# Patient Record
Sex: MALE | Race: WHITE | NOT HISPANIC OR LATINO | Employment: FULL TIME | URBAN - METROPOLITAN AREA
[De-identification: names, ages, dates, MRNs, and addresses within clinical notes are randomized per-mention and may not be internally consistent; named-entity substitution may affect disease eponyms.]

---

## 2017-01-19 ENCOUNTER — GENERIC CONVERSION - ENCOUNTER (OUTPATIENT)
Dept: OTHER | Facility: OTHER | Age: 52
End: 2017-01-19

## 2017-03-02 ENCOUNTER — ALLSCRIPTS OFFICE VISIT (OUTPATIENT)
Dept: OTHER | Facility: OTHER | Age: 52
End: 2017-03-02

## 2017-03-02 DIAGNOSIS — R94.31 ABNORMAL ELECTROCARDIOGRAM: ICD-10-CM

## 2017-03-02 DIAGNOSIS — Z00.00 ENCOUNTER FOR GENERAL ADULT MEDICAL EXAMINATION WITHOUT ABNORMAL FINDINGS: ICD-10-CM

## 2017-03-03 ENCOUNTER — GENERIC CONVERSION - ENCOUNTER (OUTPATIENT)
Dept: OTHER | Facility: OTHER | Age: 52
End: 2017-03-03

## 2017-03-06 ENCOUNTER — GENERIC CONVERSION - ENCOUNTER (OUTPATIENT)
Dept: OTHER | Facility: OTHER | Age: 52
End: 2017-03-06

## 2017-03-07 ENCOUNTER — ALLSCRIPTS OFFICE VISIT (OUTPATIENT)
Dept: OTHER | Facility: OTHER | Age: 52
End: 2017-03-07

## 2017-03-08 ENCOUNTER — GENERIC CONVERSION - ENCOUNTER (OUTPATIENT)
Dept: OTHER | Facility: OTHER | Age: 52
End: 2017-03-08

## 2017-03-08 LAB
A/G RATIO (HISTORICAL): 2.4 (ref 1.1–2.5)
ALBUMIN SERPL BCP-MCNC: 4.5 G/DL (ref 3.5–5.5)
ALP SERPL-CCNC: 52 IU/L (ref 39–117)
ALT SERPL W P-5'-P-CCNC: 13 IU/L (ref 0–44)
AST SERPL W P-5'-P-CCNC: 16 IU/L (ref 0–40)
BASOPHILS # BLD AUTO: 0 X10E3/UL (ref 0–0.2)
BASOPHILS # BLD AUTO: 1 %
BILIRUB SERPL-MCNC: 0.8 MG/DL (ref 0–1.2)
BUN SERPL-MCNC: 16 MG/DL (ref 6–24)
BUN/CREA RATIO (HISTORICAL): 14 (ref 9–20)
CALCIUM SERPL-MCNC: 9.3 MG/DL (ref 8.7–10.2)
CHLORIDE SERPL-SCNC: 100 MMOL/L (ref 96–106)
CHOLEST SERPL-MCNC: 206 MG/DL (ref 100–199)
CHOLEST/HDLC SERPL: 4 RATIO UNITS (ref 0–5)
CO2 SERPL-SCNC: 24 MMOL/L (ref 18–29)
CREAT SERPL-MCNC: 1.17 MG/DL (ref 0.76–1.27)
DEPRECATED RDW RBC AUTO: 13.6 % (ref 12.3–15.4)
EGFR AFRICAN AMERICAN (HISTORICAL): 83 ML/MIN/1.73
EGFR-AMERICAN CALC (HISTORICAL): 72 ML/MIN/1.73
EOSINOPHIL # BLD AUTO: 0.1 X10E3/UL (ref 0–0.4)
EOSINOPHIL # BLD AUTO: 1 %
GLUCOSE SERPL-MCNC: 92 MG/DL (ref 65–99)
HCT VFR BLD AUTO: 47.2 % (ref 37.5–51)
HDLC SERPL-MCNC: 51 MG/DL
HGB BLD-MCNC: 15.9 G/DL (ref 12.6–17.7)
IMM.GRANULOCYTES (CD4/8) (HISTORICAL): 0 %
IMM.GRANULOCYTES (CD4/8) (HISTORICAL): 0 X10E3/UL (ref 0–0.1)
LDLC SERPL CALC-MCNC: 132 MG/DL (ref 0–99)
LYMPHOCYTES # BLD AUTO: 1 X10E3/UL (ref 0.7–3.1)
LYMPHOCYTES # BLD AUTO: 26 %
MCH RBC QN AUTO: 30.9 PG (ref 26.6–33)
MCHC RBC AUTO-ENTMCNC: 33.7 G/DL (ref 31.5–35.7)
MCV RBC AUTO: 92 FL (ref 79–97)
MONOCYTES # BLD AUTO: 0.5 X10E3/UL (ref 0.1–0.9)
MONOCYTES (HISTORICAL): 13 %
NEUTROPHILS # BLD AUTO: 2.3 X10E3/UL (ref 1.4–7)
NEUTROPHILS # BLD AUTO: 59 %
PLATELET # BLD AUTO: 157 X10E3/UL (ref 150–379)
POTASSIUM SERPL-SCNC: 4.3 MMOL/L (ref 3.5–5.2)
RBC (HISTORICAL): 5.14 X10E6/UL (ref 4.14–5.8)
SODIUM SERPL-SCNC: 141 MMOL/L (ref 134–144)
TOT. GLOBULIN, SERUM (HISTORICAL): 1.9 G/DL (ref 1.5–4.5)
TOTAL PROTEIN (HISTORICAL): 6.4 G/DL (ref 6–8.5)
TRIGL SERPL-MCNC: 115 MG/DL (ref 0–149)
TSH SERPL DL<=0.05 MIU/L-ACNC: 2.36 UIU/ML (ref 0.45–4.5)
VLDLC SERPL CALC-MCNC: 23 MG/DL (ref 5–40)
WBC # BLD AUTO: 3.9 X10E3/UL (ref 3.4–10.8)

## 2017-03-09 LAB
BACTERIA UR QL AUTO: ABNORMAL
BILIRUB UR QL STRIP: NEGATIVE
COLOR UR: YELLOW
COMMENT (HISTORICAL): CLEAR
FECAL OCCULT BLOOD DIAGNOSTIC (HISTORICAL): NEGATIVE
GLUCOSE (HISTORICAL): NEGATIVE
KETONES UR STRIP-MCNC: NEGATIVE MG/DL
LEUKOCYTE ESTERASE UR QL STRIP: NEGATIVE
MICROSCOPIC EXAMINATION (HISTORICAL): ABNORMAL
MICROSCOPIC EXAMINATION (HISTORICAL): ABNORMAL
MUCUS THREADS (HISTORICAL): PRESENT
NITRITE UR QL STRIP: NEGATIVE
NON-SQ EPI CELLS URNS QL MICRO: ABNORMAL /HPF
PH UR STRIP.AUTO: 6.5 [PH] (ref 5–7.5)
PROT UR STRIP-MCNC: NEGATIVE MG/DL
RBC (HISTORICAL): ABNORMAL /HPF
SP GR UR STRIP.AUTO: <=1.005 (ref 1–1.03)
URINALYSIS (UA) (HISTORICAL): ABNORMAL
UROBILINOGEN UR QL STRIP.AUTO: 0.2 EU/DL (ref 0.2–1)
WBC # BLD AUTO: ABNORMAL /HPF

## 2017-03-15 ENCOUNTER — ALLSCRIPTS OFFICE VISIT (OUTPATIENT)
Dept: OTHER | Facility: OTHER | Age: 52
End: 2017-03-15

## 2017-03-16 ENCOUNTER — GENERIC CONVERSION - ENCOUNTER (OUTPATIENT)
Dept: OTHER | Facility: OTHER | Age: 52
End: 2017-03-16

## 2017-03-31 ENCOUNTER — TRANSCRIBE ORDERS (OUTPATIENT)
Dept: ADMINISTRATIVE | Facility: HOSPITAL | Age: 52
End: 2017-03-31

## 2017-03-31 DIAGNOSIS — R94.31 ABNORMAL EKG: Primary | ICD-10-CM

## 2017-04-13 ENCOUNTER — GENERIC CONVERSION - ENCOUNTER (OUTPATIENT)
Dept: OTHER | Facility: OTHER | Age: 52
End: 2017-04-13

## 2017-04-14 ENCOUNTER — HOSPITAL ENCOUNTER (OUTPATIENT)
Dept: NON INVASIVE DIAGNOSTICS | Facility: CLINIC | Age: 52
Discharge: HOME | End: 2017-04-14
Payer: COMMERCIAL

## 2017-04-14 DIAGNOSIS — R94.31 ABNORMAL EKG: ICD-10-CM

## 2017-04-14 DIAGNOSIS — R94.31 ABNORMAL ELECTROCARDIOGRAM: ICD-10-CM

## 2017-04-14 PROCEDURE — 93017 CV STRESS TEST TRACING ONLY: CPT

## 2017-04-14 PROCEDURE — 93306 TTE W/DOPPLER COMPLETE: CPT

## 2017-04-17 ENCOUNTER — GENERIC CONVERSION - ENCOUNTER (OUTPATIENT)
Dept: OTHER | Facility: OTHER | Age: 52
End: 2017-04-17

## 2017-04-25 LAB
CHEST PAIN STATEMENT: NORMAL
MAX DIASTOLIC BP: 78 MMHG
MAX HEART RATE: 181 BPM
MAX PREDICTED HEART RATE: 169 BPM
MAX. SYSTOLIC BP: 173 MMHG
PROTOCOL NAME: NORMAL
REASON FOR TERMINATION: NORMAL
TARGET HR FORMULA: NORMAL
TEST INDICATION: NORMAL
TIME IN EXERCISE PHASE: 695 S

## 2017-07-14 ENCOUNTER — GENERIC CONVERSION - ENCOUNTER (OUTPATIENT)
Dept: OTHER | Facility: OTHER | Age: 52
End: 2017-07-14

## 2017-07-14 DIAGNOSIS — M54.50 LOW BACK PAIN: ICD-10-CM

## 2017-07-25 ENCOUNTER — OFFICE VISIT (OUTPATIENT)
Dept: URGENT CARE | Facility: CLINIC | Age: 52
End: 2017-07-25
Payer: COMMERCIAL

## 2017-07-25 PROCEDURE — 90715 TDAP VACCINE 7 YRS/> IM: CPT

## 2017-07-25 PROCEDURE — 99213 OFFICE O/P EST LOW 20 MIN: CPT

## 2017-08-04 ENCOUNTER — ALLSCRIPTS OFFICE VISIT (OUTPATIENT)
Dept: OTHER | Facility: OTHER | Age: 52
End: 2017-08-04

## 2017-08-11 ENCOUNTER — ALLSCRIPTS OFFICE VISIT (OUTPATIENT)
Dept: OTHER | Facility: OTHER | Age: 52
End: 2017-08-11

## 2018-01-09 ENCOUNTER — ALLSCRIPTS OFFICE VISIT (OUTPATIENT)
Dept: OTHER | Facility: OTHER | Age: 53
End: 2018-01-09

## 2018-01-09 ENCOUNTER — APPOINTMENT (OUTPATIENT)
Dept: RADIOLOGY | Facility: CLINIC | Age: 53
End: 2018-01-09
Payer: COMMERCIAL

## 2018-01-09 ENCOUNTER — GENERIC CONVERSION - ENCOUNTER (OUTPATIENT)
Dept: OTHER | Facility: OTHER | Age: 53
End: 2018-01-09

## 2018-01-09 DIAGNOSIS — S42.009A CLOSED FRACTURE OF CLAVICLE: ICD-10-CM

## 2018-01-09 PROCEDURE — 73000 X-RAY EXAM OF COLLAR BONE: CPT

## 2018-01-09 RX ORDER — ACETAMINOPHEN 325 MG/1
650 TABLET ORAL EVERY 6 HOURS PRN
COMMUNITY

## 2018-01-09 RX ORDER — SODIUM CHLORIDE, SODIUM LACTATE, POTASSIUM CHLORIDE, CALCIUM CHLORIDE 600; 310; 30; 20 MG/100ML; MG/100ML; MG/100ML; MG/100ML
125 INJECTION, SOLUTION INTRAVENOUS CONTINUOUS
Status: CANCELLED | OUTPATIENT
Start: 2018-01-09

## 2018-01-09 NOTE — PRE-PROCEDURE INSTRUCTIONS
Pre-Surgery Instructions:   Medication Instructions    acetaminophen (TYLENOL) 325 mg tablet Instructed patient per Anesthesia Guidelines   Multiple Vitamin (MULTI VITAMIN DAILY PO) Instructed patient per Anesthesia Guidelines

## 2018-01-10 ENCOUNTER — APPOINTMENT (OUTPATIENT)
Dept: RADIOLOGY | Facility: HOSPITAL | Age: 53
End: 2018-01-10
Payer: COMMERCIAL

## 2018-01-10 ENCOUNTER — ANESTHESIA EVENT (OUTPATIENT)
Dept: PERIOP | Facility: HOSPITAL | Age: 53
End: 2018-01-10
Payer: COMMERCIAL

## 2018-01-10 ENCOUNTER — ANESTHESIA (OUTPATIENT)
Dept: PERIOP | Facility: HOSPITAL | Age: 53
End: 2018-01-10
Payer: COMMERCIAL

## 2018-01-10 ENCOUNTER — HOSPITAL ENCOUNTER (OUTPATIENT)
Facility: HOSPITAL | Age: 53
Setting detail: OUTPATIENT SURGERY
Discharge: HOME | End: 2018-01-10
Attending: ORTHOPAEDIC SURGERY | Admitting: ORTHOPAEDIC SURGERY
Payer: COMMERCIAL

## 2018-01-10 VITALS
OXYGEN SATURATION: 99 % | TEMPERATURE: 97.3 F | DIASTOLIC BLOOD PRESSURE: 77 MMHG | HEIGHT: 74 IN | BODY MASS INDEX: 22.46 KG/M2 | RESPIRATION RATE: 19 BRPM | WEIGHT: 175 LBS | SYSTOLIC BLOOD PRESSURE: 117 MMHG | HEART RATE: 88 BPM

## 2018-01-10 DIAGNOSIS — S42.022D CLOSED DISPLACED FRACTURE OF SHAFT OF LEFT CLAVICLE WITH ROUTINE HEALING, SUBSEQUENT ENCOUNTER: Primary | ICD-10-CM

## 2018-01-10 PROBLEM — S42.022A CLOSED FRACTURE OF SHAFT OF LEFT CLAVICLE: Status: ACTIVE | Noted: 2018-01-10

## 2018-01-10 PROCEDURE — C1713 ANCHOR/SCREW BN/BN,TIS/BN: HCPCS | Performed by: ORTHOPAEDIC SURGERY

## 2018-01-10 PROCEDURE — 73000 X-RAY EXAM OF COLLAR BONE: CPT

## 2018-01-10 DEVICE — 3.5MM LOCKING SCREW SLF-TPNG W/STARDRIVE(TM) RECESS 14MM: Type: IMPLANTABLE DEVICE | Site: CLAVICLE | Status: FUNCTIONAL

## 2018-01-10 DEVICE — 3.5MM CORTEX SCREW SELF-TAPPING 18MM: Type: IMPLANTABLE DEVICE | Site: CLAVICLE | Status: FUNCTIONAL

## 2018-01-10 DEVICE — 3.5MM LOCKING SCREW SLF-TPNG W/STARDRIVE(TM) RECESS 16MM: Type: IMPLANTABLE DEVICE | Site: CLAVICLE | Status: FUNCTIONAL

## 2018-01-10 DEVICE — 3.5MM LCP SUP ANT CLAVICLE PLATE 8H/RT/120MM
Type: IMPLANTABLE DEVICE | Site: CLAVICLE | Status: FUNCTIONAL
Brand: LCP

## 2018-01-10 DEVICE — 3.5MM LOCKING SCREW SLF-TPNG W/STARDRIVE(TM) RECESS 22MM: Type: IMPLANTABLE DEVICE | Site: CLAVICLE | Status: FUNCTIONAL

## 2018-01-10 DEVICE — 3.5MM LOCKING SCREW SLF-TPNG W/STARDRIVE(TM) RECESS 18MM: Type: IMPLANTABLE DEVICE | Site: CLAVICLE | Status: FUNCTIONAL

## 2018-01-10 RX ORDER — MIDAZOLAM HYDROCHLORIDE 1 MG/ML
INJECTION INTRAMUSCULAR; INTRAVENOUS AS NEEDED
Status: DISCONTINUED | OUTPATIENT
Start: 2018-01-10 | End: 2018-01-10 | Stop reason: SURG

## 2018-01-10 RX ORDER — OXYCODONE HYDROCHLORIDE AND ACETAMINOPHEN 5; 325 MG/1; MG/1
1 TABLET ORAL EVERY 4 HOURS PRN
Status: DISCONTINUED | OUTPATIENT
Start: 2018-01-10 | End: 2018-01-10 | Stop reason: HOSPADM

## 2018-01-10 RX ORDER — ONDANSETRON 2 MG/ML
4 INJECTION INTRAMUSCULAR; INTRAVENOUS EVERY 6 HOURS PRN
Status: CANCELLED | OUTPATIENT
Start: 2018-01-10

## 2018-01-10 RX ORDER — METOCLOPRAMIDE HYDROCHLORIDE 5 MG/ML
10 INJECTION INTRAMUSCULAR; INTRAVENOUS ONCE AS NEEDED
Status: DISCONTINUED | OUTPATIENT
Start: 2018-01-10 | End: 2018-01-10 | Stop reason: HOSPADM

## 2018-01-10 RX ORDER — FENTANYL CITRATE 50 UG/ML
INJECTION, SOLUTION INTRAMUSCULAR; INTRAVENOUS AS NEEDED
Status: DISCONTINUED | OUTPATIENT
Start: 2018-01-10 | End: 2018-01-10 | Stop reason: SURG

## 2018-01-10 RX ORDER — ONDANSETRON 2 MG/ML
INJECTION INTRAMUSCULAR; INTRAVENOUS AS NEEDED
Status: DISCONTINUED | OUTPATIENT
Start: 2018-01-10 | End: 2018-01-10 | Stop reason: SURG

## 2018-01-10 RX ORDER — ACETAMINOPHEN 325 MG/1
650 TABLET ORAL EVERY 6 HOURS PRN
Status: DISCONTINUED | OUTPATIENT
Start: 2018-01-10 | End: 2018-01-10 | Stop reason: HOSPADM

## 2018-01-10 RX ORDER — OXYCODONE HYDROCHLORIDE AND ACETAMINOPHEN 5; 325 MG/1; MG/1
2 TABLET ORAL EVERY 4 HOURS PRN
Status: DISCONTINUED | OUTPATIENT
Start: 2018-01-10 | End: 2018-01-10 | Stop reason: HOSPADM

## 2018-01-10 RX ORDER — ACETAMINOPHEN 325 MG/1
650 TABLET ORAL EVERY 6 HOURS PRN
Status: CANCELLED | OUTPATIENT
Start: 2018-01-10

## 2018-01-10 RX ORDER — FENTANYL CITRATE/PF 50 MCG/ML
25 SYRINGE (ML) INJECTION
Status: DISCONTINUED | OUTPATIENT
Start: 2018-01-10 | End: 2018-01-10 | Stop reason: HOSPADM

## 2018-01-10 RX ORDER — SODIUM CHLORIDE, SODIUM LACTATE, POTASSIUM CHLORIDE, CALCIUM CHLORIDE 600; 310; 30; 20 MG/100ML; MG/100ML; MG/100ML; MG/100ML
20 INJECTION, SOLUTION INTRAVENOUS CONTINUOUS
Status: DISCONTINUED | OUTPATIENT
Start: 2018-01-10 | End: 2018-01-10 | Stop reason: HOSPADM

## 2018-01-10 RX ORDER — PROPOFOL 10 MG/ML
INJECTION, EMULSION INTRAVENOUS CONTINUOUS PRN
Status: DISCONTINUED | OUTPATIENT
Start: 2018-01-10 | End: 2018-01-10 | Stop reason: SURG

## 2018-01-10 RX ORDER — HYDROCODONE BITARTRATE AND ACETAMINOPHEN 5; 325 MG/1; MG/1
TABLET ORAL
Qty: 30 TABLET | Refills: 0 | Status: SHIPPED | OUTPATIENT
Start: 2018-01-10

## 2018-01-10 RX ADMIN — SODIUM CHLORIDE, SODIUM LACTATE, POTASSIUM CHLORIDE, AND CALCIUM CHLORIDE 20 ML/HR: .6; .31; .03; .02 INJECTION, SOLUTION INTRAVENOUS at 10:32

## 2018-01-10 RX ADMIN — FENTANYL CITRATE 100 MCG: 50 INJECTION, SOLUTION INTRAMUSCULAR; INTRAVENOUS at 10:48

## 2018-01-10 RX ADMIN — MIDAZOLAM HYDROCHLORIDE 1 MG: 1 INJECTION, SOLUTION INTRAMUSCULAR; INTRAVENOUS at 11:46

## 2018-01-10 RX ADMIN — CEFAZOLIN SODIUM 2000 MG: 2 SOLUTION INTRAVENOUS at 11:45

## 2018-01-10 RX ADMIN — PROPOFOL 80 MCG/KG/MIN: 10 INJECTION, EMULSION INTRAVENOUS at 11:46

## 2018-01-10 RX ADMIN — ONDANSETRON 4 MG: 2 INJECTION INTRAMUSCULAR; INTRAVENOUS at 12:57

## 2018-01-10 RX ADMIN — MIDAZOLAM HYDROCHLORIDE 3 MG: 1 INJECTION, SOLUTION INTRAMUSCULAR; INTRAVENOUS at 10:48

## 2018-01-10 NOTE — ANESTHESIA PROCEDURE NOTES
Peripheral Block    Patient location during procedure: holding area  Start time: 1/10/2018 10:49 AM  End time: 1/10/2018 10:56 AM  Reason for block: at surgeon's request and post-op pain management  Staffing  Anesthesiologist: Blanca Seymour  Performed: anesthesiologist   Preanesthetic Checklist  Completed: patient identified, site marked, surgical consent, pre-op evaluation, timeout performed, IV checked, risks and benefits discussed and monitors and equipment checked  Peripheral Block  Patient position: supine  Prep: Betadine  Patient monitoring: heart rate, continuous pulse ox and frequent blood pressure checks  Block type: interscalene  Laterality: left  Injection technique: single-shot  Procedures: ultrasound guided and nerve stimulator  ultrasound permanent image saved    Local infiltration: ropivacaine  Infiltration strength: 0 5 %  Dose: 35 mL  Needle  Needle type: Stimuplex   Needle gauge: 22 G  Needle length: 10 cm  Needle localization: nerve stimulator and ultrasound guidance  Test dose: negative  Assessment  Injection assessment: incremental injection, local visualized surrounding nerve on ultrasound and no paresthesia on injection  Paresthesia pain: none  Slow fractionated injection: yes  Post-procedure:  site cleaned  patient tolerated the procedure well with no immediate complications

## 2018-01-10 NOTE — OP NOTE
Orthopedics OPEN REDUCTION W/ INTERNAL FIXATION (ORIF) CLAVICLE  Op Note      Pre-op Diagnosis:   Closed displaced fracture of shaft of left clavicle     Post-op Diagnosis:  Same    Procedure:  OPEN REDUCTION W/ INTERNAL FIXATION (ORIF) CLAVICLE, LEFT    Surgeon:  Surgeon(s):  MD Grace Andujar PA-C     I was present for the entire procedure, A qualified resident physician was not available and A physician assistant was required during the procedure for retraction tissue handling,dissection and suturing    Anesthesia:  Regional    Tourniquet Time:  None    Estimated Blood Loss:  Minimal    Material sent to lab:  None      Complications:  None    Findings:  Comminuted midshaft clavicle fracture stable following fixation    Indications:  47 y o  y/o male with a left midshaft clavicle fracture  Treatment options were discussed  Recommendation was made for surgical fixation  Risks and benefits were discussed which included but were not limited to infection, malunion, nonunion, neurovascular damage, failure of the implants, need for further surgery, stiffness, respiratory or cardiac failure  Informed consent was obtained  Procedure: The patient was met preoperatively and the left shoulder was identified and signed  A regional block was placed  The patient was taken back to the operating room where a Conscious sedation was performed  Patient was positioned in the  beach chair position  Bony prominences were well-padded  The upper extremity was sterilely prepped and draped in the usual fashion  A timeout was held identifying the patient, side, site, antibiotics, and allergies  The patient received Ancef preoperatively  A curvilinear incision was made over the anterior border of the clavicle  I dissected through the soft tissue, split the fascial layer, and elevated the muscle    The proximal fragment was identified 1st   I elevated the soft tissue off the ends distally at the fracture site as well as top of the clavicle  There was able to easily identify the distal clavicle in a similar fashion debrided out the hematoma, exposed the fracture site, and elevated the soft tissue off the superior border of the clavicle  There were two large butterfly fragments noted one more posterior and one more anterior  I left the soft tissue attached onto these fragments to allow the best blood supply possible  The using Tajik clamps and two point reduction clamps is able to reduce the fracture and hold the plate onto the bone  I selected an eight hole Synthes locking clavicle plate  It was secured with one screw in both the proximal and the distal and initially  I was pleased with placement of plate overall the alignment of the fracture  I then placed two more locking screws proximally and distally  There was a significant amount of comminution in the midshaft of the clavicle a I was able to reduce the large anterior piece and lock it in  I secured this with a 3 5 screw  The incision was thoroughly irrigated out  Deep fascial layer was closed with interrupted Ethibond  3-0 Vicryl was used to close the skin  Monocryl was used on the skin in a subcu fashion  Xeroform and a sterile dressing was placed  Patient was placed in a sling  Disposition:  PACU    Plan:  Patient will follow-up in 10-14 days for wound check  The patient will be in a sling  X-rays will be obtained at six weeks postoperatively  He may do gentle range of motion of the shoulder, but no lifting pushing or pulling  Sling is for comfort only      @[facility]@     Date: 1/10/2018  Time: 1:20 PM

## 2018-01-10 NOTE — PROGRESS NOTES
Assessment   1  Closed displaced fracture of shaft of left clavicle, initial encounter (810 02) (U48 022T)    Plan   Clavicle fracture    · XR CLAVICLE LEFT; Status:Resulted - Requires Verification,Retrospective By Protocol    Authorization;   Done: 14XTK0805 10:46AM  Closed displaced fracture of shaft of left clavicle, initial encounter    · Start: Esomeprazole Magnesium 40 MG Oral Capsule Delayed Release; TAKE 1    CAPSULE TWICE DAILY   · Start: Naproxen 500 MG Oral Tablet; TAKE 1 TABLET EVERY 12 HOURS AS NEEDED   · Follow-up visit in 2 weeks Evaluation and Treatment  Follow-up  Status: Hold For -    Scheduling  Requested for: [id number]    Discussion/Summary      A discussion was held with the patient and his wife regarding the diagnosis as well as treatment options  Risks and benefits and expected recovery and outcomes pull for surgical and nonsurgical treatment were discussed  At this point he was not comfortable making a decision for surgery  I will plan on having him follow up with me in two weeks  Does understand the timeliness of the decision  If he does decide in the future that he wishes to proceed with surgery I will be happy to see him back as soon as possible  1  This documentation was recorded using voice recognition software          After further thought and a discussion with his brother, Carlos Mchugh did decide to proceed with surgery  He return to the clinic for his preoperative planning  Further discussion regarding surgery, risks and benefits were discussed with him  Informed consent was obtained  1     The  patient1  was counseled regarding1  diagnostic results1 ,-- prognosis1 ,-- patient and family education1 ,-- impressions1 ,-- risks and benefits of treatment options1 ,-- importance of compliance with treatment1    Total time of encounter was 34 minutes1         1 Amended By: Willow Mitchell; Jan 09 2018 1:21 PM EST      Chief Complaint   left shoulder injury      History of Present Illness 60-year-old male here today for evaluation of his left shoulder  On January 6, 2018 he slipped on a icy patch in his garage, landing on his left side  He complains of soreness around the shoulder, his back, left knee and hip  He denies any numbness and tingling aside from some known carpal tunnel it has flared up a little bit more  If he Re positions since hand and arm in the splint a goes away  He is right-hand dominant  He works as a   he is avoiding narcotics  Patient's medical intake form was reviewed      Review of Systems      ROS reviewed  Active Problems   1  Abnormal EKG (794 31) (R94 31)  2  Acute low back pain (724 2) (M54 5)  3  Bilateral carpal tunnel syndrome (354 0) (G56 03)  4  Bilateral hearing loss (389 9) (H91 93)  5  Blood in the stool (578 1) (K92 1)  6  C6 radiculopathy (723 4) (M54 12)  7  Cervical spinal stenosis (723 0) (M48 02)  8  Clavicle fracture (810 00) (S42 009A)  9  Hematochezia (578 1) (K92 1)  10  Herniated nucleus pulposus, C3-4 left (722 0) (M50 21)  11  Laceration of thumb, left (883 0) (S61 012A)  12  Neck strain (847 0) (S16 1XXA)  13  S/P cervical spinal fusion (V45 4) (Z98 1)  14  Sensorineural hearing loss of combined types, bilateral (389 18) (H90 3)  15  Strain of left trapezius muscle (840 8) (S46 812A)  16  Strain of right trapezius muscle (840 8) (Y30 455R)    Past Medical History    · History of acute bronchitis (V12 69) (Z87 09)   · History of Known health problems: none   · Screening for colon cancer (V76 51) (Z12 11)     The active problems and past medical history were reviewed and updated today  Surgical History    · History of Knee Arthroscopy (Therapeutic)     The surgical history was reviewed and updated today         Family History   Mother    · Family history of malignant neoplasm of breast (V16 3) (Z80 3)  Father    · Family history of AICD (automatic cardioverter/defibrillator) present   · Family history of Lumbar canal stenosis  Brother    · Family history of Lumbar canal stenosis   · Family history of Prostate cancer     The family history was reviewed and updated today  Social History    · Employed in professional specialty position   · Lives with spouse   ·    · Never A Smoker   · No illicit drug use   · Occasional alcohol use   · Denied: History of Tobacco use   · Denied: History of Uses  drugs  The social history was reviewed and updated today  Current Meds   1  Multi-Vitamin Oral Tablet Recorded     The medication list was reviewed and updated today  Allergies   1  Ibuprofen CAPS    Vitals    Recorded: 99CXI8459 10:34AM   Heart Rate 88   Respiration 18   Systolic 555   Diastolic 80   Weight 121 lb 4 oz   BMI Calculated 22 76   BSA Calculated 2 06     Physical Exam      Right Shoulder: Appearance: Normal except  Tenderness: None except the  ROM: Full except as noted: Motor: Normal except as noted:   Special Tests: Negative except  Right Shoulder: no deformity, erythema, ecchymosis, edema, or tenderness,-- ROM within normal limits in all planes-- and-- strength within normal limits in all planes  midshaft clavicle Anterior shoulder  ROM: Deferred  Motor: Deferred EPL, FPL, abbuction, abduction are intact, sensation is intact to light touch of the median, radial, and ulnar nerve distribution, there is brisk cap refill of the fingers, there is a palpable radial pulse  Special Tests: positive Painful Arc  Swelling Left Shoulder Appearance[de-identified] midshaft clavicle deformity-- and-- ecchymosis  Constitutional - General appearance: Normal       Musculoskeletal - Gait and station: Normal -- Upper extremity compartments: Normal       Cardiovascular - Pulses: Normal -- Heart - RRR, no murmurs, no rubs, no gallops1   Respiratory -1  Lungs - Clear to auscultation bilaterally, no rales, no rhonci, no wheezes1   Abdomen -1  Abdomen - Soft, nontender, nondistended  1         Lymphatic -1 Palpation of lymph nodes in neck: Normal1   Skin - Skin and subcutaneous tissue: Normal       Neurologic - Sensation: Normal -- Upper extremity peripheral neuro exam: Normal       Psychiatric - Orientation to person, place, and time: Normal -- Mood and affect: Normal        1 Amended By: Nayana Recinos; Jan 09 2018 1:22 PM EST      Results/Data   I personally reviewed the films/images/results in the office today  My interpretation follows  X-ray Review x-rays show a displaced midshaft clavicle fracture with angulation, slight shortening, the butterfly fragment  X-rays today verses a photo screen shot of the x-rays from the time of the injury and show significantly more displacement  Future Appointments      Date/Time Provider Specialty Site   03/19/2018 08:00 AM Godwin Vasquez DO [facility]   01/23/2018 09:45 AM Nayana Recinos MD Orthopedic Surgery [facility]     Surgery Scheduling Form   Surgery Schedule Form [address] [place] [address] Standard:    Location:    Confirmation Number:    PROCEDURE DETAILS      Procedure Date:1  1/10/171     Requested Time:      Surgeon:Danielito samuels     Co-Surgeon:    [address] Required:      Procedure:1  ORIF L clavicle1       Bed:1  Out Patient - No Bed Required1       Laterality/Level:1  Left1   Case Length:    Anticipated frozen section:      Anesthesia:1  Regional1        Procedure Codes:    Pre-op diagnosis:    Diagnosis Code(s):    Equipment:    Equipment Needs:      Implants:1  synthese clavicle plates1          Is the patient able to walk up a flight of stairs, walk up a hill or do heavy housework WITHOUT having chest pain or shortness of breath? 1  YES1      REGISTRATION & FINANCIAL CLEARANCE    FA Initials:    Insurance:    Policy Number: Group Number:       PRE-ADMISSION TESTING/CLINICAL INFORMATION      PAT Location:1  No PATs Needed1          CONSULTS NEEDED:      Anesthesia Consult:1  No Anesthesia consult needed1       Medical Consult:1  No Medical consult needed1       Cardiac Consult:1  No Cardiac consult needed1       Other Consult:1  No Other consult needed1      ALLERGIES AND ALERTS         Latex Allergy:1  NO1       Penicillin Allergy:1  NO1       Malignant Hyperthermia:1  NO1       Diabetic Patient:1  NO1        ERAS Patient:    COMMENTS    Scheduling Information Provided By:       CASE MANAGEMENT:       1 Amended By: Deanne Marie; Jan 09 2018 1:21 PM EST      Signatures    Electronically signed by : Ricardo Hillman MD; Jan 9 2018  1:22PM EST                       (Author)

## 2018-01-10 NOTE — DISCHARGE INSTRUCTIONS
-Use sling for comfort  May come out for range of motion of elbow and GENTLE range of motion of shoulder     -Absolutely NO pushing, pulling or lifting with left upper extremity      -Place ice on the operated area for 20 minutes every hour as much as possible  This will help with pain and swelling     - Do not put any weight through the arm that was operated on    - Take pain medication as needed  - May removed bandage in 3 days  May take short showers then  Let water run over it wash gently with soap and water  Pat dry

## 2018-01-10 NOTE — ANESTHESIA PREPROCEDURE EVALUATION
Review of Systems/Medical History          Cardiovascular   Pulmonary       GI/Hepatic            Endo/Other     GYN       Hematology   Musculoskeletal       Neurology      Comment: Hx of cervical fusion Psychology           Physical Exam    Airway    Mallampati score: II  TM Distance: >3 FB  Neck ROM: full     Dental   upper dentures,     Cardiovascular  Rhythm: regular, Rate: normal,     Pulmonary  Breath sounds clear to auscultation,     Other Findings        Anesthesia Plan  ASA Score- 2     Anesthesia Type- IV sedation with anesthesia and regional with ASA Monitors  Additional Monitors:   Airway Plan:         Plan Factors-    Induction- intravenous  Postoperative Plan-     Informed Consent- Anesthetic plan and risks discussed with patient  I personally reviewed this patient with the CRNA  Discussed and agreed on the Anesthesia Plan with the CRNA  Chichi Meyer

## 2018-01-10 NOTE — DISCHARGE SUMMARY
[facility] Discharge Note  Chandrakant Felix, 47 y o  male  MRN: [id number]      Preoperative diagnosis: left displaced midshaft clavicle fracture    Postoperative diagnosis: left displaced midshaft clavicle fracture    Procedure: left clavicle ORIF    After procedure, patient was brought to PACU  Pain was controlled with IV and oral pain medication  Patient was discharged to home after cleared by anesthesia and when criteria was met  Condition: good    Discharge medications:   See after visit summary for reconciled discharge medications provided to patient and family  Please refer to discharge instructions for further information

## 2018-01-14 VITALS
RESPIRATION RATE: 16 BRPM | DIASTOLIC BLOOD PRESSURE: 70 MMHG | WEIGHT: 175 LBS | SYSTOLIC BLOOD PRESSURE: 102 MMHG | HEART RATE: 80 BPM | BODY MASS INDEX: 22.46 KG/M2 | HEIGHT: 74 IN | TEMPERATURE: 98.5 F

## 2018-01-15 VITALS
DIASTOLIC BLOOD PRESSURE: 70 MMHG | SYSTOLIC BLOOD PRESSURE: 108 MMHG | BODY MASS INDEX: 22.46 KG/M2 | TEMPERATURE: 97.8 F | HEIGHT: 74 IN | WEIGHT: 175 LBS | RESPIRATION RATE: 16 BRPM | HEART RATE: 90 BPM

## 2018-01-15 VITALS
TEMPERATURE: 98.4 F | WEIGHT: 179 LBS | DIASTOLIC BLOOD PRESSURE: 70 MMHG | BODY MASS INDEX: 23.72 KG/M2 | HEIGHT: 73 IN | HEART RATE: 64 BPM | RESPIRATION RATE: 16 BRPM | SYSTOLIC BLOOD PRESSURE: 110 MMHG

## 2018-01-15 NOTE — MISCELLANEOUS
Message   Recorded as Task   Date: 01/19/2017 12:10 PM, Created By: Kiana Ndiaye   Task Name: Follow Up   Assigned To: Rachel Mcguire   Regarding Patient: Bhanu Garcia, Status: Active   Comment:    Gretta Mae - 19 Jan 2017 12:10 PM     TASK CREATED  Dean's PSA was fine-0 38  It is scanned in  We can let him know  ShaylaRachel - 19 Jan 2017 12:15 PM     TASK EDITED  Patient advised  PLM        Active Problems    1  Bilateral carpal tunnel syndrome (354 0) (G56 03)   2  Bilateral hearing loss (389 9) (H91 93)   3  C6 radiculopathy (723 4) (M54 12)   4  Cervical spinal stenosis (723 0) (M48 02)   5  Encounter for prostate cancer screening (V76 44) (Z12 5)   6  Herniated nucleus pulposus, C3-4 left (722 0) (M50 21)   7  Neck strain (847 0) (S16 1XXA)   8  S/P cervical spinal fusion (V45 4) (Z98 1)   9  Sensorineural hearing loss of combined types, bilateral (389 18) (H90 3)   10  Special screening examination for neoplasm of prostate (V76 44) (Z12 5)   11  Strain of left trapezius muscle (840 8) (S46 812A)   12  Strain of right trapezius muscle (840 8) (L58 620I)    Current Meds   1  Multi-Vitamin Oral Tablet Recorded    Allergies    1   Ibuprofen CAPS    Signatures   Electronically signed by : Skinny Obrien, ; Jan 19 2017 12:15PM EST                       (Author)

## 2018-01-17 NOTE — MISCELLANEOUS
Message   Recorded as Task   Date: 12/19/2016 09:06 AM, Created By: Faisal Martinez   Task Name: Miscellaneous   Assigned To: Nahun Velarde   Regarding Patient: Taina Ruvalcaba, Status: Active   Comment: Faisal Martinez - 19 Dec 2016 9:06 AM     TASK CREATED  Caller: JEFF, Spouse; Other  PT NEEDS HIS PSA CK PT HAS FAMILY HISTORY PROSTATE CA PT # [phone]   Gretta Mae - 19 Dec 2016 12:01 PM     TASK REASSIGNED: Previously Assigned To Sacha Us  Please give him an order for a PSA- he may also be due for a physical    12/19/2016 Spoke to patient will call back to schedule a Complete Physical with Dr Sami Gutierrez, ordered, and printed requisition for a PSA, [facility]  trb      Active Problems    1  Bilateral carpal tunnel syndrome (354 0) (G56 03)   2  Bilateral hearing loss (389 9) (H91 93)   3  C6 radiculopathy (723 4) (M54 12)   4  Cervical spinal stenosis (723 0) (M48 02)   5  Encounter for prostate cancer screening (V76 44) (Z12 5)   6  Herniated nucleus pulposus, C3-4 left (722 0) (M50 21)   7  Neck strain (847 0) (S16 1XXA)   8  S/P cervical spinal fusion (V45 4) (Z98 1)   9  Sensorineural hearing loss of combined types, bilateral (389 18) (H90 3)   10  Special screening examination for neoplasm of prostate (V76 44) (Z12 5)   11  Strain of left trapezius muscle (840 8) (S46 812A)   12  Strain of right trapezius muscle (840 8) (P72 861O)    Current Meds   1  Multi-Vitamin Oral Tablet Recorded    Allergies    1  Ibuprofen CAPS    Plan  Encounter for prostate cancer screening    · (1) PSA (SCREEN) (Dx V76 44 Screen for Prostate Cancer); Status:Active - Retrospective  Authorization; Requested for:[id number];     Signatures   Electronically signed by :  Genaro Fournier, ; Dec 19 2016  4:59PM EST                       (Author)

## 2018-01-17 NOTE — MISCELLANEOUS
Message   Recorded as Task   Date: 07/14/2017 08:21 AM, Created By: Jemma Benavidez   Task Name: Call Back   Assigned To: Arthlorenzo Jernigan   Regarding Patient: Sincere Ricks, Status: Active   CommentKaela Morales - 14 Jul 2017 8:21 AM     TASK CREATED  Caller: Self; Other; [phone] (Home); [phone] (Work)  DR KNOX WOULD LIKE TO KNOW IF YOU WOULD WRITE A ORDER FOR LUMBAR AND PELVIC XRAYS FOR PT CONTACT PT WHEN ORDERS ARE READY HE IS HAVING LOWER BACK PAIN Cariacheliudmila 30 NO FOR PT IS [address] - 14 Jul 2017 9:23 AM     TASK REASSIGNED: Previously Assigned To Leelee Gonzalez  Please give him the order for an x-ray of the lumbar spine and liborio pelvis  07/14/2017 Printed requisitions for X-ray Lumbar spine, and hips/pelvis  trb      Active Problems    1  Abnormal EKG (794 31) (R94 31)   2  Acute low back pain (724 2) (M54 5)   3  Annual physical exam (V70 0) (Z00 00)   4  Bilateral carpal tunnel syndrome (354 0) (G56 03)   5  Bilateral hearing loss (389 9) (H91 93)   6  Blood in the stool (578 1) (K92 1)   7  Blood tests for routine general physical examination (V72 62) (Z00 00)   8  C6 radiculopathy (723 4) (M54 12)   9  Cervical spinal stenosis (723 0) (M48 02)   10  Encounter for prostate cancer screening (V76 44) (Z12 5)   11  Hematochezia (578 1) (K92 1)   12  Herniated nucleus pulposus, C3-4 left (722 0) (M50 21)   13  Neck strain (847 0) (S16 1XXA)   14  S/P cervical spinal fusion (V45 4) (Z98 1)   15  Sensorineural hearing loss of combined types, bilateral (389 18) (H90 3)   16  Special screening examination for neoplasm of prostate (V76 44) (Z12 5)   17  Strain of left trapezius muscle (840 8) (S46 812A)   18  Strain of right trapezius muscle (840 8) (Y71 260K)    Current Meds   1  Multi-Vitamin Oral Tablet Recorded    Allergies    1  Ibuprofen CAPS    Plan  Acute low back pain    · * XR SPINE LUMBAR MINIMUM 4 VIEWS NON INJURY; Status:Active - Retrospective  Authorization;  Requested for:[id number];    · XR HIPS BILATERAL 3-4 VS W PELVIS IF PERFORMED; Status:Active - Retrospective  Authorization; Requested for:[id number];     Signatures   Electronically signed by :  Genaro Fournier, ; Jul 14 2017  3:42PM EST                       (Author)

## 2018-01-17 NOTE — PROGRESS NOTES
Assessment    1  Encounter for preventive health examination (V70 0) (Z00 00)    Plan  Abnormal EKG    · ECHO COMPLETE WITH CONTRAST IF INDICATED; Status:Need Information -  Financial Authorization; Requested for:15Mar2017;    · STRESS TEST ONLY, EXERCISE; Status:Active; Requested for:15Mar2017; Health Maintenance    · Call [phone] if: You have any warning signs of skin cancer ; Status:Complete;    Done: [id number]   · Call [phone] if: You experience a new kind of chest pain (angina) or pressure ;  Status:Complete;   Done: 50CTV7007   · Always use a seat belt and shoulder strap when riding or driving a motor vehicle ;  Status:Complete;   Done: 17TAV1461   · Begin a limited exercise program ; Status:Complete;   Done: 44VGJ9803   · Begin or continue regular aerobic exercise  Gradually work up to at least 3 sessions of 30  minutes of exercise a week ; Status:Complete;   Done: 96UPH5064   · Decreasing the stress in your life may help your condition improve ; Status:Complete;    Done: 27WAV6866   · Eat a low fat and low cholesterol diet ; Status:Complete;   Done: 96UBH0762   · Eat no more than 30 grams of fat per day ; Status:Complete;   Done: 08UKB2742   · Regular aerobic exercise can help reduce stress ; Status:Complete;   Done: 33RAK2877   · Some eating tips that can help you lose weight ; Status:Complete;   Done: 20XUT1465   · Stretch and warm up your muscles during the first 10 minutes , then cool down your  muscles for the last 10 minutes of exercise ; Status:Complete;   Done: 54UXK5033   · Use a sun block product with an SPF of 15 or more ; Status:Complete;   Done:  84GWD1570   · We encourage all of our patients to exercise regularly    30 minutes of exercise or physical  activity five or more days a week is recommended for children and adults ;  Status:Complete;   Done: 91NGJ3833   · We recommend routine visits to a dentist ; Status:Complete;   Done: 56SSV9637   · Follow-up visit in 1 year Evaluation and Treatment  Follow-up  Status: Complete  Done:  68FGV6244    Discussion/Summary  Impression: health maintenance visit  Currently, he eats a healthy diet and has an adequate exercise regimen  Prostate cancer screening: the risks and benefits of prostate cancer screening were discussed and prostate cancer screening is current  Testicular cancer screening: the risks and benefits of testicular cancer screening were discussed and testicular cancer screening is current  Colorectal cancer screening: the risks and benefits of colorectal cancer screening were discussed and colonoscopy has been ordered  The risks and benefits of immunizations were discussed and patient declines immunizations  He was advised to be evaluated by an optometrist and a dentist  Advice and education were given regarding nutrition, aerobic exercise, weight bearing exercise, weight loss, calcium supplements, vitamin D supplements, cardiovascular risk reduction, tobacco cessation, sunscreen use, self skin examination, helmet use, seat belt use and advanced directive planning  Davi Yung had a normal exam today in the office  He did have evidence of a possible old anteroseptal infarct on his EKG  The patient is asymptomatic  We will send him for the cardiac testing as ordered to further evaluate his symptoms  Otherwise, he will work harder on his diet and exercise  We will see him again in the office in a year for another physical    Possible side effects of new medications were reviewed with the patient/guardian today  The treatment plan was reviewed with the patient/guardian  The patient/guardian understands and agrees with the treatment plan      Chief Complaint  Complete Physical 47year old male      History of Present Illness  HM, Adult Male: The patient is being seen for a health maintenance evaluation  General Health: The patient's health since the last visit is described as good  He does not have regular dental visits   The patient reports his last dental visit was 1 year ago and He needs to schedule  He denies vision problems  Vision care includes wearing reading glasses  He denies hearing loss  Lifestyle:  He consumes a diverse and healthy diet  He does not have any weight concerns  He exercises regularly  He does not use tobacco  The patient has never smoked cigarettes  He denies alcohol use  He denies drug use  Reproductive health:  the patient is sexually active  Screening: cancer screening reviewed and updated  metabolic screening reviewed and current  risk screening reviewed and current  HPI: Wili Higgins is a 81-year-old male who presents today for a complete physical  [facility] episodes of rectal bleeding and is going to be scheduled for colonoscopy tomorrow  He has not had any more episodes of the rectal bleeding  The patient denies any chest pain, shortness of breath, or palpitations  There is no edema  There are no headaches or visual changes  There is no lightheadedness, dizziness, or fainting spells  He has problems with the NSAIDs affecting his carpal tunnel symptoms  There are no  symptoms  Review of Systems    Constitutional: as noted in HPI  Eyes: as noted in HPI    ENT: as noted in HPI  Cardiovascular: as noted in HPI  Respiratory: as noted in HPI  Gastrointestinal: as noted in HPI  Genitourinary: as noted in HPI  Musculoskeletal: as noted in HPI  Integumentary: as noted in HPI  Neurological: as noted in HPI  Psychiatric: as noted in HPI  Active Problems    1  Annual physical exam (V70 0) (Z00 00)   2  Bilateral carpal tunnel syndrome (354 0) (G56 03)   3  Bilateral hearing loss (389 9) (H91 93)   4  Blood in the stool (578 1) (K92 1)   5  Blood tests for routine general physical examination (V72 62) (Z00 00)   6  C6 radiculopathy (723 4) (M54 12)   7  Cervical spinal stenosis (723 0) (M48 02)   8   Encounter for prostate cancer screening (V76 44) (Z12 5)   9  Hematochezia (578 1) (K92 1)   10  Herniated nucleus pulposus, C3-4 left (722 0) (M50 21)   11  Neck strain (847 0) (S16 1XXA)   12  S/P cervical spinal fusion (V45 4) (Z98 1)   13  Sensorineural hearing loss of combined types, bilateral (389 18) (H90 3)   14  Special screening examination for neoplasm of prostate (V76 44) (Z12 5)   15  Strain of left trapezius muscle (840 8) (S46 812A)   16  Strain of right trapezius muscle (840 8) (L24 152L)    Past Medical History    · History of acute bronchitis (V12 69) (Z87 09)   · History of Known health problems: none    Surgical History    · History of Knee Arthroscopy (Therapeutic)    Family History  Mother    · Family history of malignant neoplasm of breast (V16 3) (Z80 3)  Father    · Family history of AICD (automatic cardioverter/defibrillator) present   · Family history of Lumbar canal stenosis  Brother    · Family history of Lumbar canal stenosis   · Family history of Prostate cancer    Social History    · Employed in professional specialty position   · Lives with spouse   ·    · Never A Smoker   · No illicit drug use   · Occasional alcohol use   · Denied: History of Tobacco use   · Denied: History of Uses  drugs    Current Meds   1  Multi-Vitamin Oral Tablet Recorded    Allergies    1  Ibuprofen CAPS    Vitals   Recorded: [id number] 03:20PM   Temperature 98 3 F, Tympanic   Heart Rate 66, L Radial   Pulse Quality Regular, L Radial   Systolic 715, RUE, Sitting   Diastolic 70, RUE, Sitting   Height 6 ft 2 in   Weight 177 lb    BMI Calculated 22 73   BSA Calculated 2 06     Physical Exam    Constitutional   General appearance: No acute distress, well appearing and well nourished  Eyes   Conjunctiva and lids: No erythema, swelling or discharge  Pupils and irises: Equal, round, reactive to light  Ophthalmoscopic examination: Normal fundi and optic discs      Ears, Nose, Mouth, and Throat   External inspection of ears and nose: Normal  Otoscopic examination: Tympanic membranes translucent with normal light reflex  Canals patent without erythema  Hearing: Normal     Nasal mucosa, septum, and turbinates: Normal without edema or erythema  Lips, teeth, and gums: Normal, good dentition  Oropharynx: Normal with no erythema, edema, exudate or lesions  Neck   Neck: Supple, symmetric, trachea midline, no masses  Thyroid: Normal, no thyromegaly  Pulmonary   Respiratory effort: No increased work of breathing or signs of respiratory distress  Percussion of chest: Normal     Palpation of chest: Normal     Auscultation of lungs: Clear to auscultation  Cardiovascular   Palpation of heart: Normal PMI, no thrills  Auscultation of heart: Normal rate and rhythm, normal S1 and S2, no murmurs  Carotid pulses: 2+ bilaterally  Abdominal aorta: Normal     Femoral pulses: 2+ bilaterally  Pedal pulses: 2+ bilaterally  Examination of extremities for edema and/or varicosities: Normal     Chest   Breasts: Normal, no dimpling or skin changes appreciated  Palpation of breasts and axillae: Normal, no masses palpated  Chest: Normal     Abdomen   Abdomen: Non-tender, no masses  Liver and spleen: No hepatomegaly or splenomegaly  Genitourinary Performed at his last visit a few weeks ago  Lymphatic   Palpation of lymph nodes in neck: No lymphadenopathy  Palpation of lymph nodes in axillae: No lymphadenopathy  Palpation of lymph nodes in groin: No lymphadenopathy  Palpation of lymph nodes in other areas: No lymphadenopathy  Musculoskeletal   Gait and station: Normal     Inspection/palpation of digits and nails: Normal without clubbing or cyanosis  Inspection/palpation of joints, bones, and muscles: Normal     Range of motion: Normal     Stability: Normal     Muscle strength/tone: Normal     Skin   Skin and subcutaneous tissue: Normal without rashes or lesions      Palpation of skin and subcutaneous tissue: Normal turgor  Neurologic   Cranial nerves: Cranial nerves 2-12 intact  Reflexes: 2+ and symmetric  Sensation: No sensory loss  Psychiatric   Judgment and insight: Normal     Orientation to person, place and time: Normal     Recent and remote memory: Intact  Mood and affect: Normal        Results/Data  (1) CBC/PLT/DIFF [id number] 07:04AM Aníbal Dace     Test Name Result Flag Reference   WBC 3 9 x10E3/uL  3 4-10 8   RBC 5 14 x10E6/uL  4 14-5 80   Hemoglobin 15 9 g/dL  12 6-17 7   Hematocrit 47 2 %  37 5-51 0   MCV 92 fL  79-97   MCH 30 9 pg  26 6-33 0   Baso (Absolute) 0 0 x10E3/uL  0 0-0 2   Immature Granulocytes 0 %     Immature Grans (Abs) 0 0 x10E3/uL  0 0-0 1   Eos 1 %     Basos 1 %     Neutrophils (Absolute) 2 3 x10E3/uL  1 4-7 0   Lymphs (Absolute) 1 0 x10E3/uL  0 7-3 1   Monocytes(Absolute) 0 5 x10E3/uL  0 1-0 9   Eos (Absolute) 0 1 x10E3/uL  0 0-0 4   MCHC 33 7 g/dL  31 5-35 7   RDW 13 6 %  12 3-15 4   Platelets 264 G78A3/XC  150-379   Neutrophils 59 %     Lymphs 26 %     Monocytes 13 %       (1) COMPREHENSIVE METABOLIC PANEL [id number] 74:89ME Aníbal Dace     Test Name Result Flag Reference   Glucose, Serum 92 mg/dL  65-99   BUN 16 mg/dL  6-24   Creatinine, Serum 1 17 mg/dL  0 76-1 27   eGFR If NonAfricn Am 72 mL/min/1 73  >59   Alkaline Phosphatase, S 52 IU/L     AST (SGOT) 16 IU/L  0-40   Carbon Dioxide, Total 24 mmol/L  18-29   Bilirubin, Total 0 8 mg/dL  0 0-1 2   eGFR If Africn Am 83 mL/min/1 73  >59   BUN/Creatinine Ratio 14  9-20   Sodium, Serum 141 mmol/L  134-144   Potassium, Serum 4 3 mmol/L  3 5-5 2   Chloride, Serum 100 mmol/L     ALT (SGPT) 13 IU/L  0-44   Calcium, Serum 9 3 mg/dL  8 7-10 2   Protein, Total, Serum 6 4 g/dL  6 0-8 5   Albumin, Serum 4 5 g/dL  3 5-5 5   Globulin, Total 1 9 g/dL  1 5-4 5   A/G Ratio 2 4  1 1-2 5   **Effective March 13, 2017 the reference interval**                   for A/G Ratio will be changing to:                               Age Male          Female                           0 -  7 days       1 1 - 2 3       1 1 - 2 3                           8 - 30 days       1 2 - 2 8       1 2 - 2 8                           1 -  6 months     1 3 - 3 6       1 3 - 3 6                    7 months -  5 years      1 5 - 2 6       1 5 - 2 6                              > 5 years      1 2 - 2 2       1 2 - 2 2     (1) LIPID PANEL, FASTING [id number] 07:04AM Yan Lucas     Test Name Result Flag Reference   VLDL Cholesterol Oscar 23 mg/dL  5-40   LDL Cholesterol Calc 132 mg/dL H 0-99   T  Chol/HDL Ratio 4 0 ratio units  0 0-5 0   T  Chol/HDL Ratio                                                             Men  Women                                               1/2 Avg  Risk  3 4    3 3                                                   Avg Risk  5 0    4 4                                                2X Avg  Risk  9 6    7 1                                                3X Avg  Risk 23 4   11 0   Cholesterol, Total 206 mg/dL H 100-199   Triglycerides 115 mg/dL  0-149   HDL Cholesterol 51 mg/dL  >39     (1) TSH [id number] 07:04AM Yan Lucas     Test Name Result Flag Reference   TSH 2 360 uIU/mL  0 450-4 500     [facility]) UA/M w/rflx Culture, Comp [id number] 07:04AM Yan Lucas     Test Name Result Flag Reference   Specific Gravity <=1 005 A 1 005-1 030   pH 6 5  5 0-7 5   Urine-Color Yellow  Yellow   Appearance Clear  Clear   WBC Esterase Negative  Negative   Protein Negative  Negative/Trace   Mucus Threads Present  Not Estab  Bacteria Few  None seen/Few   Microscopic Examination Comment     Microscopic follows if indicated  Microscopic Examination See below:     Urinalysis Reflex Comment     This specimen will not reflex to a Urine Culture     WBC 0-5 /hpf  0 -  5   RBC None seen /hpf  0 -  2   Epithelial Cells (non renal) None seen /hpf  0 - 10   Glucose Negative  Negative   Ketones Negative  Negative   Occult Blood Negative  Negative   Bilirubin Negative Negative   Urobilinogen,Semi-Qn 0 2 EU/dL  0 2-1 0   Nitrite, Urine Negative  Negative     (1) PSA (SCREEN) (Dx V76 44 Screen for Prostate Cancer) [id number] 12:08PM Sim Harvey     Test Name Result Flag Reference   PSA 0 38         Procedure    Procedure: Visual Acuity Test    Indication: routine screening  Inforrmation supplied by a Snellen chart     Results: 20/15 in both eyes without corrective device, 20/20 in the right eye without corrective device, 20/15 in the left eye without corrective device      Future Appointments    Date/Time Provider Specialty Site   03/19/2018 08:00 AM Sim Harvey DO Family Medicine [facility]     Signatures   Electronically signed by : Shaw De DO; Mar 15 2017  5:58PM EST                       (Author)

## 2018-01-22 VITALS
HEART RATE: 66 BPM | WEIGHT: 177 LBS | DIASTOLIC BLOOD PRESSURE: 70 MMHG | HEIGHT: 74 IN | TEMPERATURE: 98.3 F | SYSTOLIC BLOOD PRESSURE: 110 MMHG | BODY MASS INDEX: 22.72 KG/M2

## 2018-01-22 VITALS
WEIGHT: 177.25 LBS | HEART RATE: 88 BPM | BODY MASS INDEX: 22.76 KG/M2 | SYSTOLIC BLOOD PRESSURE: 117 MMHG | DIASTOLIC BLOOD PRESSURE: 80 MMHG | RESPIRATION RATE: 18 BRPM

## 2018-01-23 NOTE — RESULT NOTES
Verified Results  XR CLAVICLE LEFT [id number] 10:46AM Herminia Solis Order Number: [id number]     Test Name Result Flag Reference   XR CLAVICLE LEFT (Report)     LEFT CLAVICLE     INDICATION: [id number]: Fracture of unspecified part of unspecified clavicle, initial encounter for closed fracture  History taken directly from the electronic ordering system  COMPARISON: None     VIEWS: 2     IMAGES: 2     FINDINGS:     Comminuted and angulated mid clavicular fracture  Before meals joint is top normal in caliber  No lytic or blastic lesions are seen  Soft tissues are unremarkable  IMPRESSION:     Comminuted and angulated midclavicular fracture         Workstation performed: [id number]     Signed by:   Melida Casillas MD   1/9/18

## 2018-02-21 ENCOUNTER — APPOINTMENT (OUTPATIENT)
Dept: RADIOLOGY | Facility: CLINIC | Age: 53
End: 2018-02-21
Payer: COMMERCIAL

## 2018-02-21 ENCOUNTER — OFFICE VISIT (OUTPATIENT)
Dept: OBGYN CLINIC | Facility: CLINIC | Age: 53
End: 2018-02-21

## 2018-02-21 VITALS
BODY MASS INDEX: 23.38 KG/M2 | HEIGHT: 74 IN | HEART RATE: 73 BPM | WEIGHT: 182.2 LBS | SYSTOLIC BLOOD PRESSURE: 121 MMHG | DIASTOLIC BLOOD PRESSURE: 77 MMHG

## 2018-02-21 DIAGNOSIS — S42.022D CLOSED DISPLACED FRACTURE OF SHAFT OF LEFT CLAVICLE WITH ROUTINE HEALING, SUBSEQUENT ENCOUNTER: Primary | ICD-10-CM

## 2018-02-21 PROCEDURE — 99024 POSTOP FOLLOW-UP VISIT: CPT | Performed by: ORTHOPAEDIC SURGERY

## 2018-02-21 PROCEDURE — 73000 X-RAY EXAM OF COLLAR BONE: CPT

## 2018-02-21 RX ORDER — NAPROXEN 500 MG/1
500 TABLET ORAL EVERY 12 HOURS PRN
Refills: 0 | COMMUNITY
Start: 2018-01-09 | End: 2018-10-24 | Stop reason: SDUPTHER

## 2018-02-21 RX ORDER — OMEPRAZOLE 20 MG/1
20 CAPSULE, DELAYED RELEASE ORAL 2 TIMES DAILY
Refills: 0 | COMMUNITY
Start: 2018-01-11 | End: 2018-10-24 | Stop reason: SDUPTHER

## 2018-02-21 NOTE — ASSESSMENT & PLAN NOTE
50-year-old male status post an open reduction internal fixation of a left midshaft clavicle fracture on January 10th  He is doing well overall  He will follow up in six weeks with new x-rays of the left clavicle  We discussed the importance limited weight-bearing and loading on that

## 2018-02-21 NOTE — PROGRESS NOTES
Assessment:     1  Closed displaced fracture of shaft of left clavicle with routine healing, subsequent encounter        Plan:     Problem List Items Addressed This Visit        Musculoskeletal and Integument    Closed fracture of shaft of left clavicle - Primary     60-year-old male status post an open reduction internal fixation of a left midshaft clavicle fracture on January 10th  He is doing well overall  He will follow up in six weeks with new x-rays of the left clavicle  We discussed the importance limited weight-bearing and loading on that  Relevant Orders    XR clavicle left         Subjective:     Patient ID: Candance Palma is a 47 y o  male  Chief Complaint:  60-year-old male status post an open reduction internal fixation of a left comminuted midshaft clavicle fracture on January 10th he feels very good  He does note that the plate is slightly prominent medially  He has some muscle swinging in the lateral aspect of his shoulder  He notes that he has difficulty reaching all the way around touching the back of his shoulder blade on the contralateral side        Allergy:  Allergies   Allergen Reactions    Ibuprofen      Aggravates carpel tunnel     Medications:  all current active meds have been reviewed  Past Medical History:  Past Medical History:   Diagnosis Date    Bronchitis     Hand fracture, left     Laceration of left thumb     Spinal stenosis of cervical region      Past Surgical History:  Past Surgical History:   Procedure Laterality Date    ARTHROSCOPY KNEE      CERVICAL FUSION      TX OPEN TREATMENT CLAVICULAR FRACTURE INTERNAL FX Left 1/10/2018    Procedure: OPEN REDUCTION W/ INTERNAL FIXATION (ORIF) CLAVICLE;  Surgeon: Lucero Cortez MD;  [facility];  Service: Orthopedics     Family History:  Family History   Problem Relation Age of Onset    Cancer Mother     Pneumonia Father     No Known Problems Sister     Cancer Brother     Cancer Brother      Social History:  History   Alcohol Use    Yes     Comment: rarely     History   Drug Use No     History   Smoking Status    Never Smoker   Smokeless Tobacco    Never Used     Review of Systems      Objective:  BP Readings from Last 1 Encounters:   02/21/18 121/77      Wt Readings from Last 1 Encounters:   02/21/18 82 6 kg (182 lb 3 2 oz)      BMI:   Estimated body mass index is 23 39 kg/m² as calculated from the following:    Height as of this encounter: 6' 2" (1 88 m)  Weight as of this encounter: 82 6 kg (182 lb 3 2 oz)  BSA:   Estimated body surface area is 2 09 meters squared as calculated from the following:    Height as of this encounter: 6' 2" (1 88 m)  Weight as of this encounter: 82 6 kg (182 lb 3 2 oz)  Physical Exam  Left Shoulder Exam     Tenderness   Left shoulder tenderness location: Mild sensitivity to palpation along the medial aspect of the plate with slight tenderness over the deltoid muscle laterally on the shoulder  Range of Motion   Forward Flexion: normal   External Rotation: normal     Comments:  Incision healing very well            I have personally reviewed pertinent films in PACS and my interpretation is Well-aligned midshaft clavicle fracture, no significant healing noted at this time, no evidence of loosening

## 2018-02-27 ENCOUNTER — OFFICE VISIT (OUTPATIENT)
Dept: FAMILY MEDICINE CLINIC | Facility: CLINIC | Age: 53
End: 2018-02-27
Payer: COMMERCIAL

## 2018-02-27 VITALS
BODY MASS INDEX: 24.52 KG/M2 | DIASTOLIC BLOOD PRESSURE: 72 MMHG | OXYGEN SATURATION: 97 % | HEART RATE: 78 BPM | SYSTOLIC BLOOD PRESSURE: 100 MMHG | WEIGHT: 181 LBS | TEMPERATURE: 96.8 F | HEIGHT: 72 IN | RESPIRATION RATE: 16 BRPM

## 2018-02-27 DIAGNOSIS — J01.00 ACUTE NON-RECURRENT MAXILLARY SINUSITIS: Primary | ICD-10-CM

## 2018-02-27 PROBLEM — M54.50 ACUTE LOW BACK PAIN: Status: ACTIVE | Noted: 2017-07-14

## 2018-02-27 PROBLEM — K92.1 HEMATOCHEZIA: Status: ACTIVE | Noted: 2017-03-02

## 2018-02-27 PROBLEM — R94.31 ABNORMAL EKG: Status: ACTIVE | Noted: 2017-03-15

## 2018-02-27 PROCEDURE — 99213 OFFICE O/P EST LOW 20 MIN: CPT | Performed by: NURSE PRACTITIONER

## 2018-02-27 RX ORDER — AMOXICILLIN AND CLAVULANATE POTASSIUM 875; 125 MG/1; MG/1
1 TABLET, FILM COATED ORAL 2 TIMES DAILY
Qty: 14 TABLET | Refills: 0 | Status: SHIPPED | OUTPATIENT
Start: 2018-02-27 | End: 2018-03-06

## 2018-02-27 NOTE — PATIENT INSTRUCTIONS

## 2018-02-27 NOTE — PROGRESS NOTES
Assessment/Plan:         Diagnoses and all orders for this visit:    Acute non-recurrent maxillary sinusitis  -     amoxicillin-clavulanate (AUGMENTIN) 875-125 mg per tablet; Take 1 tablet by mouth 2 (two) times a day for 7 days          Subjective:      Patient ID: Hannah Benson is a 47 y o  male  Sinus Problem   This is a new problem  The current episode started 1 to 4 weeks ago (began with cold symptoms three weeks ago)  The problem has been gradually worsening since onset  There has been no fever  His pain is at a severity of 4/10  Associated symptoms include congestion, sinus pressure, sneezing and a sore throat  Pertinent negatives include no chills, coughing, diaphoresis, ear pain, headaches, hoarse voice, neck pain or swollen glands  Past treatments include acetaminophen  The treatment provided no relief  The following portions of the patient's history were reviewed and updated as appropriate: allergies, current medications, past family history, past medical history, past social history, past surgical history and problem list     Review of Systems   Constitutional: Negative  Negative for chills and diaphoresis  HENT: Positive for congestion, rhinorrhea, sinus pain, sinus pressure, sneezing and sore throat  Negative for ear pain and hoarse voice  Eyes: Negative  Respiratory: Negative  Negative for cough  Cardiovascular: Negative  Negative for chest pain  Gastrointestinal: Negative  Negative for abdominal pain, diarrhea and nausea  Endocrine: Negative  Genitourinary: Negative  Musculoskeletal: Negative  Negative for neck pain  Skin: Negative  Allergic/Immunologic: Negative  Neurological: Negative  Negative for headaches  Hematological: Negative  Psychiatric/Behavioral: Negative            Objective:      /72 (BP Location: Right arm, Patient Position: Sitting, Cuff Size: Large)   Pulse 78   Temp (!) 96 8 °F (36 °C) (Tympanic)   Resp 16   Ht 6' (1 829 m)   Wt 82 1 kg (181 lb)   SpO2 97%   BMI 24 55 kg/m²          Physical Exam   Constitutional: He is oriented to person, place, and time  He appears well-developed and well-nourished  No distress  HENT:   Head: Normocephalic  Right Ear: External ear normal  No mastoid tenderness  Tympanic membrane is not injected, not erythematous and not bulging  A middle ear effusion is present  Left Ear: External ear normal  No mastoid tenderness  Tympanic membrane is not injected, not erythematous and not bulging  A middle ear effusion is present  Nose: Mucosal edema and rhinorrhea present  Right sinus exhibits maxillary sinus tenderness  Right sinus exhibits no frontal sinus tenderness  Left sinus exhibits maxillary sinus tenderness  Left sinus exhibits no frontal sinus tenderness  Mouth/Throat: Uvula is midline  No oropharyngeal exudate, posterior oropharyngeal edema, posterior oropharyngeal erythema (cobblestone posterior pharynx ) or tonsillar abscesses  Eyes: Conjunctivae are normal  Pupils are equal, round, and reactive to light  Right eye exhibits no discharge  Left eye exhibits no discharge  No scleral icterus  Neck: Normal range of motion  Neck supple  No thyromegaly present  Cardiovascular: Normal rate, regular rhythm and normal heart sounds  Exam reveals no friction rub  No murmur heard  Pulmonary/Chest: Effort normal and breath sounds normal  No respiratory distress  He has no wheezes  Abdominal: Soft  Bowel sounds are normal  He exhibits no distension  There is no tenderness  Musculoskeletal: Normal range of motion  He exhibits no edema  Lymphadenopathy:     He has no cervical adenopathy  Neurological: He is alert and oriented to person, place, and time  Skin: Skin is warm and dry  No rash noted  He is not diaphoretic  No erythema  Psychiatric: He has a normal mood and affect   His behavior is normal  Judgment and thought content normal

## 2018-03-12 DIAGNOSIS — Z12.5 SCREENING PSA (PROSTATE SPECIFIC ANTIGEN): ICD-10-CM

## 2018-03-12 DIAGNOSIS — Z00.00 WELL ADULT EXAM: Primary | ICD-10-CM

## 2018-03-12 DIAGNOSIS — Z11.59 NEED FOR HEPATITIS C SCREENING TEST: ICD-10-CM

## 2018-04-04 ENCOUNTER — APPOINTMENT (OUTPATIENT)
Dept: RADIOLOGY | Facility: CLINIC | Age: 53
End: 2018-04-04
Payer: COMMERCIAL

## 2018-04-04 ENCOUNTER — OFFICE VISIT (OUTPATIENT)
Dept: OBGYN CLINIC | Facility: CLINIC | Age: 53
End: 2018-04-04

## 2018-04-04 VITALS
WEIGHT: 180 LBS | DIASTOLIC BLOOD PRESSURE: 75 MMHG | SYSTOLIC BLOOD PRESSURE: 106 MMHG | HEART RATE: 66 BPM | HEIGHT: 72 IN | BODY MASS INDEX: 24.38 KG/M2

## 2018-04-04 DIAGNOSIS — S42.025G CLOSED NONDISPLACED FRACTURE OF SHAFT OF LEFT CLAVICLE WITH DELAYED HEALING, SUBSEQUENT ENCOUNTER: Primary | ICD-10-CM

## 2018-04-04 DIAGNOSIS — S42.025D CLOSED NONDISPLACED FRACTURE OF SHAFT OF LEFT CLAVICLE WITH ROUTINE HEALING, SUBSEQUENT ENCOUNTER: ICD-10-CM

## 2018-04-04 PROCEDURE — 99024 POSTOP FOLLOW-UP VISIT: CPT | Performed by: ORTHOPAEDIC SURGERY

## 2018-04-04 PROCEDURE — 73000 X-RAY EXAM OF COLLAR BONE: CPT

## 2018-04-04 NOTE — ASSESSMENT & PLAN NOTE
55-year-old male with a left clavicle fracture status post open reduction and internal fixation with a delayed union  He still has a 2-5 mm gap at the fracture site  Recommended a bone stimulator for him  He will continue no lifting or weight-bearing through the left shoulder  Will see him back in three months with x-rays of the left clavicle

## 2018-04-04 NOTE — PROGRESS NOTES
Assessment:     1  Closed nondisplaced fracture of shaft of left clavicle with delayed healing, subsequent encounter        Plan:     Problem List Items Addressed This Visit        Musculoskeletal and Integument    Closed fracture of shaft of left clavicle - Primary     41-year-old male with a left clavicle fracture status post open reduction and internal fixation with a delayed union  He still has a 2-5 mm gap at the fracture site  Recommended a bone stimulator for him  He will continue no lifting or weight-bearing through the left shoulder  Will see him back in three months with x-rays of the left clavicle  Relevant Orders    XR clavicle left    Osteogenesic Stimulator         Subjective:     Patient ID: Meño Marino is a 47 y o  male  Chief Complaint:  41-year-old male status post an open reduction internal fixation of a left comminuted midshaft clavicle fracture on January 10th he feels very good  He does note that the plate is slightly prominent medially  He feels that he has regained near full motion of the shoulder  He does not have any significant pain  The cold damp weather does cause some achiness in the area         Allergy:  Allergies   Allergen Reactions    Ibuprofen      Aggravates carpel tunnel     Medications:  all current active meds have been reviewed  Past Medical History:  Past Medical History:   Diagnosis Date    Bronchitis     Hand fracture, left     Laceration of left thumb     Spinal stenosis of cervical region      Past Surgical History:  Past Surgical History:   Procedure Laterality Date    ARTHROSCOPY KNEE      CERVICAL FUSION      AR OPEN TREATMENT CLAVICULAR FRACTURE INTERNAL FX Left 1/10/2018    Procedure: OPEN REDUCTION W/ INTERNAL FIXATION (ORIF) CLAVICLE;  Surgeon: Marilee Guillen MD;  Location: [facility] OR;  Service: Orthopedics    SHOULDER SURGERY       Family History:  Family History   Problem Relation Age of Onset    Cancer Mother     Pneumonia Father  No Known Problems Sister     Cancer Brother     Cancer Brother      Social History:  History   Alcohol Use    Yes     Comment: rarely     History   Drug Use No     History   Smoking Status    Never Smoker   Smokeless Tobacco    Never Used     Review of Systems      Objective:  BP Readings from Last 1 Encounters:   04/04/18 106/75      Wt Readings from Last 1 Encounters:   04/04/18 81 6 kg (180 lb)      BMI:   Estimated body mass index is 24 41 kg/m² as calculated from the following:    Height as of this encounter: 6' (1 829 m)  Weight as of this encounter: 81 6 kg (180 lb)  BSA:   Estimated body surface area is 2 04 meters squared as calculated from the following:    Height as of this encounter: 6' (1 829 m)  Weight as of this encounter: 81 6 kg (180 lb)  Physical Exam  Left Shoulder Exam     Tenderness   The patient is experiencing tenderness in the clavicle (Mild sensitivity to palpation along the medial aspect of the plate with slight tenderness over the deltoid muscle laterally on the shoulder)  Range of Motion   The patient has normal left shoulder ROM  Forward Flexion: normal   External Rotation: normal     Muscle Strength   The patient has normal left shoulder strength  Comments:  Incision well healed  Plate is prominent medially  I have personally reviewed pertinent films in PACS and my interpretation is Well-aligned midshaft clavicle fracture, slightly improved healing, but still a 2-5 mm gap at the fracture site

## 2018-05-15 ENCOUNTER — TELEPHONE (OUTPATIENT)
Dept: OBGYN CLINIC | Facility: CLINIC | Age: 53
End: 2018-05-15

## 2018-05-15 NOTE — TELEPHONE ENCOUNTER
Called Monica Keith regarding the bone stimulator so he will work on it stat  He never got the paperwork  Faxed the paperwork to him  Called Sanya Weathers to let her know what is happening

## 2018-05-15 NOTE — TELEPHONE ENCOUNTER
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

## 2018-06-29 ENCOUNTER — OFFICE VISIT (OUTPATIENT)
Dept: OBGYN CLINIC | Facility: CLINIC | Age: 53
End: 2018-06-29
Payer: COMMERCIAL

## 2018-06-29 ENCOUNTER — APPOINTMENT (OUTPATIENT)
Dept: RADIOLOGY | Facility: CLINIC | Age: 53
End: 2018-06-29
Payer: COMMERCIAL

## 2018-06-29 VITALS
HEART RATE: 90 BPM | HEIGHT: 67 IN | BODY MASS INDEX: 28.25 KG/M2 | WEIGHT: 180 LBS | SYSTOLIC BLOOD PRESSURE: 111 MMHG | DIASTOLIC BLOOD PRESSURE: 83 MMHG

## 2018-06-29 DIAGNOSIS — S42.025D CLOSED NONDISPLACED FRACTURE OF SHAFT OF LEFT CLAVICLE WITH ROUTINE HEALING, SUBSEQUENT ENCOUNTER: Primary | ICD-10-CM

## 2018-06-29 DIAGNOSIS — S42.025D CLOSED NONDISPLACED FRACTURE OF SHAFT OF LEFT CLAVICLE WITH ROUTINE HEALING, SUBSEQUENT ENCOUNTER: ICD-10-CM

## 2018-06-29 PROCEDURE — 99213 OFFICE O/P EST LOW 20 MIN: CPT | Performed by: ORTHOPAEDIC SURGERY

## 2018-06-29 PROCEDURE — 73000 X-RAY EXAM OF COLLAR BONE: CPT

## 2018-06-29 NOTE — ASSESSMENT & PLAN NOTE
60-year-old male with a left clavicle fracture status post open reduction and internal fixation with a delayed union  X-rays today show increased healing with bone stimulator  Continue use of bone stimulator daily  He may start to use that arm more  He is to avoid any repetitive lifting  Follow-up in 2 months with repeat x-rays  All questions were answered to their satisfaction

## 2018-06-29 NOTE — PROGRESS NOTES
Assessment:     1  Closed nondisplaced fracture of shaft of left clavicle with routine healing, subsequent encounter        Plan:  The patient was seen and examined by Dr Belle Jones and myself  Problem List Items Addressed This Visit        Musculoskeletal and Integument    Closed fracture of shaft of left clavicle - Primary     60-year-old male with a left clavicle fracture status post open reduction and internal fixation with a delayed union  X-rays today show increased healing with bone stimulator  Continue use of bone stimulator daily  He may start to use that arm more  He is to avoid any repetitive lifting  Follow-up in 2 months with repeat x-rays  All questions were answered to their satisfaction  Relevant Orders    XR clavicle left         Subjective:     Patient ID: Odin Adams is a 47 y o  male  Chief Complaint:  60-year-old male status post an open reduction internal fixation of a left comminuted midshaft clavicle fracture on January 10th  He received the bone stimulator about 6 weeks ago and has been using as directed  He has minimal discomfort over the medial aspect of the clavicle  He is moving his arm with no pain  He has been avoiding lifting with that arm        Allergy:  Allergies   Allergen Reactions    Ibuprofen      Aggravates carpel tunnel     Medications:  all current active meds have been reviewed  Past Medical History:  Past Medical History:   Diagnosis Date    Bronchitis     Hand fracture, left     Laceration of left thumb     No known health problems     Spinal stenosis of cervical region      Past Surgical History:  Past Surgical History:   Procedure Laterality Date    ARTHROSCOPY KNEE      CERVICAL FUSION      [place] OPEN TREATMENT CLAVICULAR FRACTURE INTERNAL FX Left 1/10/2018    Procedure: OPEN REDUCTION W/ INTERNAL FIXATION (ORIF) CLAVICLE;  Surgeon: Faye Bolton MD;  Location: [facility] OR;  Service: Orthopedics    SHOULDER SURGERY       Family History:  Family History   Problem Relation Age of Onset   [facility] Cancer Mother     Breast cancer Mother     Pneumonia Father     Heart disease Father         AICD automatic cardioverter/defibrillator present    Other Father         lumbar canal stenosis    No Known Problems Sister     Cancer Brother     Other Brother         lumbar canal stenosis    Prostate cancer Brother     Cancer Brother      Social History:  History   Alcohol Use    Yes     Comment: rarely / occasional use per allscript      History   Drug Use No     History   Smoking Status    Never Smoker   Smokeless Tobacco    Never Used     Review of Systems      Objective:  BP Readings from Last 1 Encounters:   06/29/18 111/83      Wt Readings from Last 1 Encounters:   06/29/18 81 6 kg (180 lb)      BMI:   Estimated body mass index is 28 19 kg/m² as calculated from the following:    Height as of this encounter: 5' 7" (1 702 m)  Weight as of this encounter: 81 6 kg (180 lb)  BSA:   Estimated body surface area is 1 93 meters squared as calculated from the following:    Height as of this encounter: 5' 7" (1 702 m)  Weight as of this encounter: 81 6 kg (180 lb)  Physical Exam  Left Shoulder Exam     Tenderness   The patient is experiencing tenderness in the clavicle (Mild sensitivity to palpation along the medial aspect of the plate)  Range of Motion   The patient has normal left shoulder ROM  Forward Flexion: normal   External Rotation: normal     Muscle Strength   The patient has normal left shoulder strength  Comments:  Incision well healed  Plate is prominent medially  I have personally reviewed pertinent films in PACS and my interpretation is Well-aligned midshaft clavicle fracture  There is interval healing compared to last films

## 2018-10-02 ENCOUNTER — APPOINTMENT (OUTPATIENT)
Dept: RADIOLOGY | Facility: CLINIC | Age: 53
End: 2018-10-02
Payer: COMMERCIAL

## 2018-10-02 ENCOUNTER — OFFICE VISIT (OUTPATIENT)
Dept: OBGYN CLINIC | Facility: CLINIC | Age: 53
End: 2018-10-02
Payer: COMMERCIAL

## 2018-10-02 VITALS — BODY MASS INDEX: 28.25 KG/M2 | HEIGHT: 67 IN | WEIGHT: 180 LBS

## 2018-10-02 DIAGNOSIS — M25.512 ACUTE PAIN OF LEFT SHOULDER: ICD-10-CM

## 2018-10-02 DIAGNOSIS — S42.022D CLOSED DISPLACED FRACTURE OF SHAFT OF LEFT CLAVICLE WITH ROUTINE HEALING, SUBSEQUENT ENCOUNTER: ICD-10-CM

## 2018-10-02 DIAGNOSIS — M25.512 ACUTE PAIN OF LEFT SHOULDER: Primary | ICD-10-CM

## 2018-10-02 PROCEDURE — 99213 OFFICE O/P EST LOW 20 MIN: CPT | Performed by: ORTHOPAEDIC SURGERY

## 2018-10-02 PROCEDURE — 73000 X-RAY EXAM OF COLLAR BONE: CPT

## 2018-10-02 RX ORDER — NAPROXEN AND ESOMEPRAZOLE MAGNESIUM 500; 20 MG/1; MG/1
TABLET, DELAYED RELEASE ORAL
Qty: 60 TABLET | Refills: 0 | Status: SHIPPED | OUTPATIENT
Start: 2018-10-02

## 2018-10-02 NOTE — ASSESSMENT & PLAN NOTE
15-year-old male with a left clavicle fracture status post open reduction and internal fixation with a delayed union  X-rays today showed a healed fracture  He may discontinue the bone stimulator  He has no restrictions  He was given a refill for Viri Chávez discussed that the hardware only has to come out if he has increased pain in the future  Follow-up as needed if any problems arise  All questions were answered to their satisfaction

## 2018-10-02 NOTE — PROGRESS NOTES
Assessment:     1  Acute pain of left shoulder    2  Closed displaced fracture of shaft of left clavicle with routine healing, subsequent encounter        Plan:  The patient was seen and examined by Dr Márquez Fairly and myself  Problem List Items Addressed This Visit        Musculoskeletal and Integument    Closed fracture of shaft of left clavicle     61-year-old male with a left clavicle fracture status post open reduction and internal fixation with a delayed union  X-rays today showed a healed fracture  He may discontinue the bone stimulator  He has no restrictions  He was given a refill for Vimovo  Dr Márquez Fairly discussed that the hardware only has to come out if he has increased pain in the future  Follow-up as needed if any problems arise  All questions were answered to their satisfaction  Relevant Medications    Naproxen-Esomeprazole (VIMOVO) 500-20 MG TBEC      Other Visit Diagnoses     Acute pain of left shoulder    -  Primary    Relevant Orders    XR clavicle left         Subjective:     Patient ID: Kofi Villarreal is a 47 y o  male  Chief Complaint:  61-year-old male status post an open reduction internal fixation of a left comminuted midshaft clavicle fracture on January 10th  He has been using the bone stimulator daily since last visit  He has minimal discomfort  He continues to avoid any heavy lifting, pushing or pulling        Allergy:  Allergies   Allergen Reactions    Ibuprofen      Aggravates carpel tunnel     Medications:  all current active meds have been reviewed  Past Medical History:  Past Medical History:   Diagnosis Date    Bronchitis     Hand fracture, left     Laceration of left thumb     No known health problems     Spinal stenosis of cervical region      Past Surgical History:  Past Surgical History:   Procedure Laterality Date    ARTHROSCOPY KNEE      CERVICAL FUSION      [place] OPEN TREATMENT CLAVICULAR FRACTURE INTERNAL FX Left 1/10/2018    Procedure: OPEN REDUCTION W/ INTERNAL FIXATION (ORIF) CLAVICLE;  Surgeon: Keesha Burgos MD;  Location:  MAIN OR;  Service: Orthopedics    SHOULDER SURGERY       Family History:  Family History   Problem Relation Age of Onset   [facility] Cancer Mother     Breast cancer Mother     Pneumonia Father     Heart disease Father         AICD automatic cardioverter/defibrillator present    Other Father         lumbar canal stenosis    No Known Problems Sister     Cancer Brother     Other Brother         lumbar canal stenosis    Prostate cancer Brother     Cancer Brother      Social History:  History   Alcohol Use    Yes     Comment: rarely / occasional use per allscript      History   Drug Use No     History   Smoking Status    Never Smoker   Smokeless Tobacco    Never Used     Review of Systems      Objective:  BP Readings from Last 1 Encounters:   06/29/18 111/83      Wt Readings from Last 1 Encounters:   10/02/18 81 6 kg (180 lb)      BMI:   Estimated body mass index is 28 19 kg/m² as calculated from the following:    Height as of this encounter: 5' 7" (1 702 m)  Weight as of this encounter: 81 6 kg (180 lb)  BSA:   Estimated body surface area is 1 93 meters squared as calculated from the following:    Height as of this encounter: 5' 7" (1 702 m)  Weight as of this encounter: 81 6 kg (180 lb)  Physical Exam  Left Shoulder Exam     Tenderness   The patient is experiencing no tenderness  Range of Motion   The patient has normal left shoulder ROM  Forward Flexion: normal   External Rotation: normal     Muscle Strength   The patient has normal left shoulder strength  Comments:  Incision well healed  Plate is prominent medially  I have personally reviewed pertinent films in PACS and my interpretation is Well-aligned midshaft clavicle fracture  There is significant healing compared to last films

## 2018-10-24 ENCOUNTER — TELEPHONE (OUTPATIENT)
Dept: OBGYN CLINIC | Facility: HOSPITAL | Age: 53
End: 2018-10-24

## 2018-10-24 DIAGNOSIS — Z47.89 AFTERCARE FOLLOWING SURGERY OF THE MUSCULOSKELETAL SYSTEM: Primary | ICD-10-CM

## 2018-10-24 RX ORDER — OMEPRAZOLE 20 MG/1
20 CAPSULE, DELAYED RELEASE ORAL DAILY
Qty: 60 CAPSULE | Refills: 1 | Status: SHIPPED | OUTPATIENT
Start: 2018-10-24

## 2018-10-24 RX ORDER — NAPROXEN 500 MG/1
500 TABLET ORAL 2 TIMES DAILY WITH MEALS
Qty: 60 TABLET | Refills: 1 | Status: SHIPPED | OUTPATIENT
Start: 2018-10-24 | End: 2018-12-20 | Stop reason: SDUPTHER

## 2018-10-24 NOTE — TELEPHONE ENCOUNTER
Dr [facility] patient    Patient called stating he was unable to  his prescription of Vimovo from the Pharmacy  I called the pharmacy to check on the status of the script and the pharmacy stated that it was rejected by insurance for cost  Is there any way the two medications could be ordered separately with the instructions to take together so that insurance will cover? Please advise   Thank you

## 2018-12-20 DIAGNOSIS — Z47.89 AFTERCARE FOLLOWING SURGERY OF THE MUSCULOSKELETAL SYSTEM: ICD-10-CM

## 2018-12-20 RX ORDER — NAPROXEN 500 MG/1
TABLET ORAL
Qty: 60 TABLET | Refills: 1 | Status: SHIPPED | OUTPATIENT
Start: 2018-12-20
